# Patient Record
(demographics unavailable — no encounter records)

---

## 2024-10-24 NOTE — HISTORY OF PRESENT ILLNESS
[TextBox_4] : 69YO Female never smoker PMH obesity, HLD, preDM, carotid artery aneurysm, allergies, and chronic cough initially seen 2 years ago for cough.   PFTs at that time did not show an obstructive pattern but did show mild diffusion limitation and obesity related flow augmentation limitation. CXR from 8/2020 was clear. She appeared to have good functional status being able to walk 2 miles up and down the stairs without SOB.  CBC was notable for eosinophilia to 0.67 and IgE was elevated at 1599.   On repeat testing in 10/2022 eos were 460, RAST was positive to oak, ragweed, cat, house dust, mites, glendy grass, dog, IgE was 1725. I advised antihistamine and fluticasone nasal spray.  Social: Tobacco: Never smoker  Alcohol: Denies Drugs: Denies Occupation: Homemaker  Exposure: Denies, denies pets including birds Surgical Hx: Denies Family Hx: Denies hx of lung disease, heart disease. Her daughter was diagnosed with Rheumatoid arthritis   Returns for follow up today:  Feels well.  When she uses fluticasone, montelukast and symbicort she had no symptoms, comes in for refills today.   She has no complaints, denies allergic rhinitis or breathing difficulty. Declined flu vaccine today.

## 2024-10-24 NOTE — ASSESSMENT
[FreeTextEntry1] : 71YO Female never smoker PMH obesity, HLD, preDM, carotid artery aneurysm, allergies, and chronic cough who presents for followup.  She has allergic rhinitis and several environmental allergies.  She is well controlled on montelukast, fluticasone. Cough has been well controlled with Symbicort which she uses once per day and albuterol as needed. Has had mild eosinophilia and elevated IgE in the past.  Parasite workup was negative.  ON PE today VSS lungs are clear and she appears well.  Plan: montelukast and fluticasoen renewed Symbicort 80/4.5 2 puffs BID.  Advised that she can try one puff twice daily, but to increse to two puffs twice daily if she has difficulty, Albuterol as needed.

## 2025-05-29 NOTE — ASSESSMENT
[FreeTextEntry1] : Pt as outlined. Needs to catch up w HCMs incl Mammo/US, Dexa, now seems amenable to first Colonoscopy. Vax: ref for now, might be amenable another day.  Needs singulare renewed.  At end of visit pt states has vaginal tenderness w wiping. I offered to examine, she prefers to see a gynecologist, referral provided. She is not sexually active but might want to be in future, also might need vag E.   [Vaccines Reviewed] : Immunizations reviewed today. Please see immunization details in the vaccine log within the immunization flowsheet.

## 2025-05-29 NOTE — HISTORY OF PRESENT ILLNESS
[de-identified] : 72 YO woman for AWV- last visit about 2 yrs ago h/o  obesity, allergies, HLD, preDM, int carotid aneurysm, joint pains, asthma/cough, OP Sees Rheum, Pulm, w/u neg.  [facility] has ref but now seems amenable Mammo 12/23 Dexa 12/23 OP -2.6  not on meds doesn't like to be on pills Vax: ref but might consider at another visit  Optho appt pending Derm Gyn GI due Requests therapist referral for depr/anx ongoing Marital issues not s.a. x 15 yrs w husb

## 2025-05-29 NOTE — HEALTH RISK ASSESSMENT
[Good] : ~his/her~  mood as  good [No] : No [0] : 2) Feeling down, depressed, or hopeless: Not at all (0) [PHQ-2 Negative - No further assessment needed] : PHQ-2 Negative - No further assessment needed [Never] : Never [Patient reported mammogram was normal] : Patient reported mammogram was normal [Patient reported bone density results were abnormal] : Patient reported bone density results were abnormal [Patient declined colonoscopy] : Patient declined colonoscopy [HIV test declined] : HIV test declined [Hepatitis C test declined] : Hepatitis C test declined [With Significant Other] : lives with significant other [] :  [# Of Children ___] : has [unfilled] children [Feels Safe at Home] : Feels safe at home [Fully functional (bathing, dressing, toileting, transferring, walking, feeding)] : Fully functional (bathing, dressing, toileting, transferring, walking, feeding) [Fully functional (using the telephone, shopping, preparing meals, housekeeping, doing laundry, using] : Fully functional and needs no help or supervision to perform IADLs (using the telephone, shopping, preparing meals, housekeeping, doing laundry, using transportation, managing medications and managing finances) [Smoke Detector] : smoke detector [Safety elements used in home] : safety elements used in home [Seat Belt] :  uses seat belt [de-identified] : walking [de-identified] : healthy but increased carbs gained wt [[id number]] : 0 [Change in mental status noted] : No change in mental status noted [Language] : denies difficulty with language [High Risk Behavior] : no high risk behavior [Reports changes in hearing] : Reports no changes in hearing [Reports changes in vision] : Reports no changes in vision [Reports changes in dental health] : Reports no changes in dental health [MammogramDate] : 12/23 [BoneDensityDate] : 12/23